# Patient Record
Sex: FEMALE | Race: WHITE | NOT HISPANIC OR LATINO | Employment: OTHER | URBAN - METROPOLITAN AREA
[De-identification: names, ages, dates, MRNs, and addresses within clinical notes are randomized per-mention and may not be internally consistent; named-entity substitution may affect disease eponyms.]

---

## 2017-06-09 ENCOUNTER — IMPORTED ENCOUNTER (OUTPATIENT)
Dept: URBAN - METROPOLITAN AREA CLINIC 43 | Facility: CLINIC | Age: 82
End: 2017-06-09

## 2017-06-09 PROBLEM — H16.223: Noted: 2017-06-09

## 2017-06-09 PROBLEM — H43.813: Noted: 2017-06-09

## 2017-06-09 PROBLEM — H35.363: Noted: 2017-06-09

## 2017-06-14 ENCOUNTER — IMPORTED ENCOUNTER (OUTPATIENT)
Dept: URBAN - METROPOLITAN AREA CLINIC 43 | Facility: CLINIC | Age: 82
End: 2017-06-14

## 2018-05-07 ENCOUNTER — IMPORTED ENCOUNTER (OUTPATIENT)
Dept: URBAN - METROPOLITAN AREA CLINIC 43 | Facility: CLINIC | Age: 83
End: 2018-05-07

## 2018-05-18 ENCOUNTER — IMPORTED ENCOUNTER (OUTPATIENT)
Dept: URBAN - METROPOLITAN AREA CLINIC 43 | Facility: CLINIC | Age: 83
End: 2018-05-18

## 2018-05-29 ENCOUNTER — IMPORTED ENCOUNTER (OUTPATIENT)
Dept: URBAN - METROPOLITAN AREA CLINIC 43 | Facility: CLINIC | Age: 83
End: 2018-05-29

## 2018-05-29 PROBLEM — H53.15: Noted: 2018-05-29

## 2018-05-29 PROBLEM — H16.223: Noted: 2018-05-29

## 2018-10-01 ENCOUNTER — IMPORTED ENCOUNTER (OUTPATIENT)
Dept: URBAN - METROPOLITAN AREA CLINIC 43 | Facility: CLINIC | Age: 83
End: 2018-10-01

## 2019-02-25 ENCOUNTER — IMPORTED ENCOUNTER (OUTPATIENT)
Dept: URBAN - METROPOLITAN AREA CLINIC 43 | Facility: CLINIC | Age: 84
End: 2019-02-25

## 2019-02-25 ENCOUNTER — PREPPED CHART (OUTPATIENT)
Dept: URBAN - METROPOLITAN AREA CLINIC 32 | Facility: CLINIC | Age: 84
End: 2019-02-25

## 2019-02-25 PROBLEM — H16.223: Noted: 2019-02-25

## 2020-02-19 NOTE — PATIENT DISCUSSION
Patient advised that retinal condition may limit visual recovery following cataract surgery. F/U w/ [facility].

## 2020-03-03 NOTE — PATIENT DISCUSSION
No evidence of occlusion today or any damage from occlusion. Monitor with Dr. Marin Alcantara. Diamox with surgery to be proactive.

## 2020-03-30 ASSESSMENT — VISUAL ACUITY
OD_SC: J1
OD_SC: 20/100
OS_SC: 20/25
OS_SC: J16

## 2020-03-30 ASSESSMENT — TONOMETRY
OD_IOP_MMHG: 15
OS_IOP_MMHG: 13

## 2020-04-19 ASSESSMENT — TONOMETRY
OD_IOP_MMHG: 16.0
OS_IOP_MMHG: 11.0
OD_IOP_MMHG: 15.0
OS_IOP_MMHG: 15.0
OD_IOP_MMHG: 16.0
OS_IOP_MMHG: 13.0

## 2020-04-19 ASSESSMENT — VISUAL ACUITY
OD_OTHER: <20/400.
OS_CC: 20/20
OS_SC: J16
OD_SC: J1+
OD_SC: 20/80
OS_SC: 20/25
OS_CC: 20/25
OD_SC: J1
OS_SC: 20/25
OD_SC: 20/100
OS_SC: 20/25
OD_SC: 20/100
OD_CC: 20/25
OD_CC: 20/25
OD_SC: J1

## 2020-04-19 ASSESSMENT — KERATOMETRY
OD_AXISANGLE2_DEGREES: 62
OD_AXISANGLE_DEGREES: 152
OS_K1POWER_DIOPTERS: 44.5
OD_K1POWER_DIOPTERS: 44.75
OS_AXISANGLE2_DEGREES: 93
OD_K1POWER_DIOPTERS: 44.25
OD_K2POWER_DIOPTERS: 44.5
OS_K2POWER_DIOPTERS: 44
OS_K2POWER_DIOPTERS: 44.25
OS_AXISANGLE_DEGREES: 3
OS_AXISANGLE2_DEGREES: 5
OS_K1POWER_DIOPTERS: 43.75
OD_AXISANGLE2_DEGREES: 115
OD_K2POWER_DIOPTERS: 44.5
OS_AXISANGLE_DEGREES: 95
OD_AXISANGLE_DEGREES: 25

## 2020-05-05 NOTE — PATIENT DISCUSSION
No evidence of occlusion today or any damage from occlusion. Monitor with Dr. Yuli Rascon with surgery to be proactive.

## 2020-05-05 NOTE — PATIENT DISCUSSION
No evidence of occlusion today or any damage from occlusion. Monitor with Dr. Carlo Hackett. Diamox with surgery to be proactive.

## 2020-05-06 NOTE — PATIENT DISCUSSION
No evidence of occlusion today or any damage from occlusion. Monitor with Dr. Chrystal Moore. Diamox with surgery to be proactive.

## 2020-05-06 NOTE — PATIENT DISCUSSION
Left a detailed voice mail for guardians/parents, medication is being processed at their pharmacy. For further questions parents can call our office at [phone] our office hours are 8am-5pm Monday-Friday. Left detailed message with providers message.    Recommended observation.

## 2020-05-06 NOTE — PATIENT DISCUSSION
Cataract surgery has been performed in the first eye and activities of daily living are still impaired. The patient would like to proceed with cataract surgery in the second eye as scheduled. The patient elects ADV with TMF +3.25 OD, goal of [place]irm[place].

## 2020-05-12 NOTE — PATIENT DISCUSSION
No evidence of occlusion today or any damage from occlusion. Monitor with Dr. Daniel Shirley. Diamox with surgery to be proactive.

## 2020-05-13 NOTE — PROCEDURE NOTE: CLINICAL
PROCEDURE NOTE: Bandage Contact Lens #1 OD. Diagnosis: Pseudophakia 1 Day Post-Op. A therapeutic soft contact lens of the of the appropriate size and base curve was selected and then applied to the cornea. The lens fit well and moved appropriately. Meka Perez

## 2020-05-15 NOTE — PATIENT DISCUSSION
No evidence of occlusion today or any damage from occlusion. Monitor with Dr. Kemi Russell. Diamox with surgery to be proactive.

## 2021-08-24 ENCOUNTER — ESTABLISHED COMPREHENSIVE EXAM (OUTPATIENT)
Dept: URBAN - METROPOLITAN AREA CLINIC 32 | Facility: CLINIC | Age: 86
End: 2021-08-24

## 2021-08-24 DIAGNOSIS — H04.123: ICD-10-CM

## 2021-08-24 PROCEDURE — 92014 COMPRE OPH EXAM EST PT 1/>: CPT

## 2021-08-24 ASSESSMENT — VISUAL ACUITY
OD_SC: 20/200
OS_PH: 20/40
OD_PH: 20/50
OD_SC: J1
OS_SC: J16
OS_SC: 20/70

## 2021-08-24 ASSESSMENT — KERATOMETRY
OS_K2POWER_DIOPTERS: 43.75
OS_AXISANGLE_DEGREES: 180
OD_AXISANGLE_DEGREES: 175
OD_K1POWER_DIOPTERS: 45.00
OS_K1POWER_DIOPTERS: 44.75
OD_AXISANGLE2_DEGREES: 85
OS_AXISANGLE2_DEGREES: 90
OD_K2POWER_DIOPTERS: 44.00

## 2021-08-24 ASSESSMENT — TONOMETRY
OD_IOP_MMHG: 12
OS_IOP_MMHG: 11

## 2022-06-04 ENCOUNTER — TELEPHONE ENCOUNTER (OUTPATIENT)
Dept: URBAN - METROPOLITAN AREA CLINIC 68 | Facility: CLINIC | Age: 87
End: 2022-06-04

## 2022-06-05 ENCOUNTER — TELEPHONE ENCOUNTER (OUTPATIENT)
Dept: URBAN - METROPOLITAN AREA CLINIC 68 | Facility: CLINIC | Age: 87
End: 2022-06-05

## 2022-06-25 ENCOUNTER — TELEPHONE ENCOUNTER (OUTPATIENT)
Age: 87
End: 2022-06-25

## 2022-06-26 ENCOUNTER — TELEPHONE ENCOUNTER (OUTPATIENT)
Age: 87
End: 2022-06-26

## 2024-05-21 ENCOUNTER — COMPREHENSIVE EXAM (OUTPATIENT)
Dept: URBAN - METROPOLITAN AREA CLINIC 32 | Facility: CLINIC | Age: 89
End: 2024-05-21

## 2024-05-21 DIAGNOSIS — H04.123: ICD-10-CM

## 2024-05-21 DIAGNOSIS — G43.B0: ICD-10-CM

## 2024-05-21 DIAGNOSIS — H16.143: ICD-10-CM

## 2024-05-21 DIAGNOSIS — H43.813: ICD-10-CM

## 2024-05-21 DIAGNOSIS — Z96.1: ICD-10-CM

## 2024-05-21 DIAGNOSIS — H35.033: ICD-10-CM

## 2024-05-21 PROCEDURE — 99214 OFFICE O/P EST MOD 30 MIN: CPT

## 2024-05-21 ASSESSMENT — KERATOMETRY
OS_AXISANGLE_DEGREES: 180
OS_K1POWER_DIOPTERS: 44.75
OD_K1POWER_DIOPTERS: 45.00
OS_K2POWER_DIOPTERS: 43.75
OS_AXISANGLE2_DEGREES: 90
OD_AXISANGLE2_DEGREES: 85
OD_AXISANGLE_DEGREES: 175
OD_K2POWER_DIOPTERS: 44.00

## 2024-05-21 ASSESSMENT — VISUAL ACUITY
OS_SC: 20/50-2
OD_SC: J1+/-2
OD_SC: 20/300
OS_SC: J7
OS_PH: 20/40
OD_PH: 20/50+2

## 2024-05-21 ASSESSMENT — TONOMETRY
OD_IOP_MMHG: 11
OS_IOP_MMHG: 11